# Patient Record
Sex: MALE | Race: WHITE
[De-identification: names, ages, dates, MRNs, and addresses within clinical notes are randomized per-mention and may not be internally consistent; named-entity substitution may affect disease eponyms.]

---

## 2022-08-20 ENCOUNTER — HOSPITAL ENCOUNTER (EMERGENCY)
Dept: HOSPITAL 75 - ER FS | Age: 27
Discharge: HOME | End: 2022-08-20
Payer: SELF-PAY

## 2022-08-20 VITALS — DIASTOLIC BLOOD PRESSURE: 77 MMHG | SYSTOLIC BLOOD PRESSURE: 139 MMHG

## 2022-08-20 VITALS — HEIGHT: 66.97 IN | BODY MASS INDEX: 21.21 KG/M2 | WEIGHT: 135.14 LBS

## 2022-08-20 DIAGNOSIS — Z28.310: ICD-10-CM
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
## 2022-08-20 DIAGNOSIS — Z23: ICD-10-CM

## 2022-08-20 DIAGNOSIS — F17.210: ICD-10-CM

## 2022-08-20 DIAGNOSIS — S61.411A: Primary | ICD-10-CM

## 2022-08-20 PROCEDURE — 90715 TDAP VACCINE 7 YRS/> IM: CPT

## 2022-08-20 PROCEDURE — 12001 RPR S/N/AX/GEN/TRNK 2.5CM/<: CPT

## 2022-08-20 NOTE — ED UPPER EXTREMITY
General


Chief Complaint:  Laceration


Stated Complaint:  RT HAND LAC


Nursing Triage Note:  


Patient presents to the ED with c/o laceration to right hand. States he was 


moving a cast iron table when it tipped over and fell on his right hand causing 


the laceration.


Source:  patient


Exam Limitations:  no limitations





History of Present Illness


Date Seen by Provider:  Aug 20, 2022


Time Seen by Provider:  12:02


Initial Comments


23-year-old right-handed male patient states he was moving a cast iron table and

it tripped and cut his right hand on thenar area.  Patient rated his pain as a 

moderate pain and denies other injuries.  Patient is not up-to-date with tetanus

immunization.


Onset:  just prior to arrival





Allergies and Home Medications


Allergies


Coded Allergies:  


     Penicillins (Verified  Allergy, Unknown, 8/20/22)





Patient Home Medication List


Home Medication List Reviewed:  Yes





Review of Systems


Constitutional:  no symptoms reported


EENTM:  no symptoms reported


Respiratory:  no symptoms reported


Cardiovascular:  no symptoms reported


Gastrointestinal:  no symptoms reported


Genitourinary:  no symptoms reported


Musculoskeletal:  see HPI


Skin:  see HPI


Psychiatric/Neurological:  No Symptoms Reported





All Other Systems Reviewed


Negative Unless Noted:  Yes





Past Medical-Social-Family Hx


Patient Social History


Tobacco Use?:  Yes


Tobacco type used:  Cigarettes


Substance use?:  No


Alcohol Use?:  Yes


Alcohol type:  Beer


Alcohol Frequency:  Once in a while


Pt feels they are or have been:  No





Immunizations Up To Date


First/Initial COVID19 Vaccinat:  Not currently vaccinated





Past Medical History


Surgery/Hospitalization HX:  


Hernia repair; migraines





Physical Exam


Vital Signs





Vital Signs - First Documented








 8/20/22





 12:03


 


Temp 36.8


 


Pulse 85


 


Resp 14


 


B/P (MAP) 139/77 (97)


 


Pulse Ox 98


 


O2 Delivery Room Air





Capillary Refill : Less Than 3 Seconds


Height, Weight, BMI


Height: '"


Weight: lbs. oz. kg; 21.00 BMI


Method:


General Appearance:  WD/WN, no apparent distress


HEENT:  PERRL/EOMI, normal ENT inspection


Neck:  non-tender, full range of motion, supple


Cardiovascular:  regular rate, rhythm, no edema


Respiratory:  chest non-tender, lungs clear, normal breath sounds, no 

respiratory distress, no accessory muscle use


Back:  normal inspection


Shoulder:  normal inspection


Elbow/Forearm:  normal inspection


Wrist:  Yes normal inspection


Hand:  Right, laceration


Neurologic/Tendon:  normal sensation, normal motor functions, normal tendon 

functions


Neurologic/Psychiatric:  no motor/sensory deficits


Skin:  normal color, tattoos/piercings


2 cm flap laceration on right thenar area without active bleeding





Procedures/Interventions





   Wound Location:  Upper Extremities


   Wound Length (cm):  2


   Wound's Depth, Shape:  flap


   Wound Explored:  no foreign body removed


   Irrigated w/ Saline (ccs):  30


   Wound Debrided:  none


   Suture:  Prolene


   Suture Size:  4-0


   Number of Sutures:  5


   Layer Closure?:  1


   Sterile Dressing Applied?:  Yes





Progress/Results/Core Measures


Results/Orders


My Orders





Orders - PATRICK PEREZ MD


Lidocaine 1% Inj 50 Ml (Xylocaine 1% Inj (8/20/22 12:04)


Dipht,Pertuss(Acell),Tet Adult (Boostrix (8/20/22 12:30)


Lidocaine 1% Inj 50 Ml (Xylocaine 1% Inj (8/20/22 12:45)





Medications Given in ED





Current Medications








 Medications  Dose


 Ordered  Sig/Wilbur


 Route  Start Time


 Stop Time Status Last Admin


Dose Admin


 


 Diphtheria/


 Tetanus/Acell


 Pertussis  0.5 ml  ONCE ONCE


 IM  8/20/22 12:30


 8/20/22 12:31 DC 8/20/22 12:41


0.5 ML


 


 Lidocaine HCl  50 ml  STK-MED ONCE


 .ROUTE  8/20/22 12:04


 8/20/22 12:06 DC 8/20/22 12:44


50 ML








Vital Signs/I&O











 8/20/22





 12:03


 


Temp 36.8


 


Pulse 85


 


Resp 14


 


B/P (MAP) 139/77 (97)


 


Pulse Ox 98


 


O2 Delivery Room Air














Blood Pressure Mean:                    97











Progress


Progress Note :  


Progress Note


Evaluation of patient in ER showed 26-year-old right-handed male patient with 

flap laceration of volar side of right hand that was repaired with 5 sutures.  

Tdap was given and patient advised to keep wound clean and dry and return to ER 

or follow-up with his primary care physician in 10 days for suture removal.





Departure


Impression





   Primary Impression:  


   Laceration of right hand


   Qualified Codes:  S61.411A - Laceration without foreign body of right hand, 

   initial encounter


Disposition:  01 HOME, SELF-CARE


Condition:  Improved





Departure-Patient Inst.


Decision time for Depature:  12:31


Referrals:  


NO,LOCAL PHYSICIAN (PCP/Family)


Primary Care Physician


Patient Instructions:  Laceration Repair With Stitches ED, Wound Care ED, 

Tetanus Toxoid (Adsorbed)





Add. Discharge Instructions:  


Keep wound clean and dry


Suture removal in 10 days


May take Tylenol and ibuprofen as needed for pain


Return to ER as needed





All discharge instructions reviewed with patient and/or family. Voiced 

understanding.











PATRICK PEREZ MD             Aug 20, 2022 12:32

## 2022-10-31 ENCOUNTER — HOSPITAL ENCOUNTER (EMERGENCY)
Dept: HOSPITAL 75 - ER FS | Age: 27
Discharge: HOME | End: 2022-10-31
Payer: SELF-PAY

## 2022-10-31 VITALS — BODY MASS INDEX: 20.83 KG/M2 | HEIGHT: 66.97 IN | WEIGHT: 132.72 LBS

## 2022-10-31 VITALS — SYSTOLIC BLOOD PRESSURE: 129 MMHG | DIASTOLIC BLOOD PRESSURE: 64 MMHG

## 2022-10-31 DIAGNOSIS — Z28.310: ICD-10-CM

## 2022-10-31 DIAGNOSIS — Z88.0: ICD-10-CM

## 2022-10-31 DIAGNOSIS — K03.81: ICD-10-CM

## 2022-10-31 DIAGNOSIS — K02.9: Primary | ICD-10-CM

## 2022-10-31 PROCEDURE — 99283 EMERGENCY DEPT VISIT LOW MDM: CPT

## 2022-10-31 NOTE — ED EENT
History of Present Illness


General


Chief Complaint:  Dental Problems/Pain


Stated Complaint:  TOOTH PAIN


Source:  patient


Exam Limitations:  no limitations





History of Present Illness


Date Seen by Provider:  Oct 31, 2022


Time Seen by Provider:  04:08


Initial Comments


27-year-old male with no pertinent past medical history coming in due to 

bilateral tooth pain on the upper part of his mouth.  He states he has had bad 

teeth for a while, on Thursday started getting worse, scheduled an appointment 

for a dentist in 2 days from today.  Pain worsened yesterday and he feels like 

one of the teeth broke a little bit more.  Pain is constant, throbbing, severe. 

Took Tylenol couple hours ago which did help somewhat.  Is otherwise denying any

other acute complaints including any swelling or fever.





Allergies and Home Medications


Allergies


Coded Allergies:  


     Penicillins (Verified  Allergy, Unknown, 8/20/22)





Patient Home Medication List


Home Medication List Reviewed:  Yes





Review of Systems


Review of Systems


Constitutional:  No fever


Eyes:  No Symptoms Reported


Ears:  No Symptoms Reported


Nose:  no symptoms reported


Mouth:  see HPI


Throat:  no symptoms reported


Respiratory:  no symptoms reported


Cardiovascular:  no symptoms reported


Gastrointestinal:  no symptoms reported


Musculoskeletal:  no symptoms reported


Skin:  no symptoms reported


Neurological:  No Symptoms Reported


Hematologic/Lymphatic:  No Symptoms Reported


Immunological/Allergic:  no symptoms reported





All Other Systems Reviewed


Negative Unless Noted:  Yes





Past Medical-Social-Family Hx


Patient Social History


Substance use?:  No





Immunizations Up To Date


First/Initial COVID19 Vaccinat:  Not currently vaccinated





Past Medical History


Surgery/Hospitalization HX:  


Hernia repair; migraines


Surgeries:  Yes





Physical Exam


Height, Weight, BMI


Height: '"


Weight: lbs. oz. kg; 21.00 BMI


Method:


General Appearance:  WD/WN, mild distress


Eyes:  bilateral eye normal inspection


Ears:  bilateral ear auricle normal


Nose:  normal inspection


Mouth/Throat:  dental tenderness (No abscess felt, floor of mouth is soft, 

normal oropharynx, no trismus, normal voice)


Neck:  non-tender, full range of motion, supple, normal inspection


Cardiovascular:  regular rate, rhythm, no edema, no murmur


Respiratory:  chest non-tender, lungs clear, normal breath sounds, no 

respiratory distress, no accessory muscle use


Gastrointestinal:  normal bowel sounds, non tender, soft; No distended, No 

guarding, No rebound


Neurologic/Psychiatric:  no motor/sensory deficits, alert, normal mood/affect


Skin:  normal color, warm/dry





Procedures/Interventions





   Suture Size:  4-0





Progress/Results/Core Measures


Results/Orders


My Orders





Orders - ALLY DEAN MD


Clindamycin Capsule (Cleocin Capsule) (10/31/22 04:15)


Ketorolac Injection (Toradol Injection) (10/31/22 04:15)








Progress


Progress Note :  


Progress Note


27-year-old male with above history coming in due to dental pain.  ABCs were 

intact and vitals were stable on presentation.  Physical exam with multiple 

dental caries and a couple fractured teeth, but no swelling or obvious abscess. 

No red flags otherwise.  Given clindamycin and IM Toradol.  Has an appointment 

with his dentist in a couple of days.  Will be given antibiotics to get him 

through the week.





Departure


Impression





   Primary Impression:  


   Pain, dental


Disposition:  01 HOME, SELF-CARE


Condition:  Stable





Departure-Patient Inst.


Decision time for Depature:  04:25


Referrals:  


NO,LOCAL PHYSICIAN (PCP/Family)


Primary Care Physician


Patient Instructions:  Dental Pain





Add. Discharge Instructions:  


These teeth do need to come out, the antibiotics will help with the problem for 

short period of time, but think of it like a Band-Aid, and the problem will come

back.  Toradol was sent to your pharmacy as well.  Do not mix this with 

ibuprofen, but you can take Tylenol with it.


Scripts


Clindamycin HCl (Clindamycin HCl) 300 Mg Capsule


300 MG PO QID for 7 Days, #28 CAP


   Prov: ALLY DEAN MD         10/31/22 


Ketorolac Tromethamine (Ketorolac Tromethamine) 10 Mg Tablet


10 MG PO Q6H for 3 Days, #12 TAB


   Prov: ALLY DEAN MD         10/31/22


Work/School Note:  Work Release Form   Date Seen in the Emergency Department:  

Oct 31, 2022


   Return to Work:  Nov 2, 2022


   Restrictions:  No Restrictions











ALLY DEAN MD          Oct 31, 2022 04:20

## 2023-10-14 ENCOUNTER — HOSPITAL ENCOUNTER (EMERGENCY)
Dept: HOSPITAL 75 - ER FS | Age: 28
Discharge: HOME | End: 2023-10-14
Payer: COMMERCIAL

## 2023-10-14 VITALS — BODY MASS INDEX: 21.18 KG/M2 | HEIGHT: 66.93 IN | WEIGHT: 134.92 LBS

## 2023-10-14 VITALS — SYSTOLIC BLOOD PRESSURE: 114 MMHG | DIASTOLIC BLOOD PRESSURE: 58 MMHG
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
## 2023-10-14 DIAGNOSIS — Z28.310: ICD-10-CM

## 2023-10-14 DIAGNOSIS — R51.9: Primary | ICD-10-CM

## 2023-10-14 PROCEDURE — 70450 CT HEAD/BRAIN W/O DYE: CPT
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
